# Patient Record
Sex: FEMALE | URBAN - METROPOLITAN AREA
[De-identification: names, ages, dates, MRNs, and addresses within clinical notes are randomized per-mention and may not be internally consistent; named-entity substitution may affect disease eponyms.]

---

## 2023-08-11 ENCOUNTER — APPOINTMENT (RX ONLY)
Dept: URBAN - METROPOLITAN AREA CLINIC 81 | Facility: CLINIC | Age: 48
Setting detail: DERMATOLOGY
End: 2023-08-11

## 2023-08-11 DIAGNOSIS — D492 NEOPLASM OF UNSPECIFIED NATURE OF BONE, SOFT TISSUE, AND SKIN: ICD-10-CM

## 2023-08-11 DIAGNOSIS — D485 NEOPLASM OF UNCERTAIN BEHAVIOR OF SKIN: ICD-10-CM

## 2023-08-11 DIAGNOSIS — L65.9 NONSCARRING HAIR LOSS, UNSPECIFIED: ICD-10-CM

## 2023-08-11 PROBLEM — D48.5 NEOPLASM OF UNCERTAIN BEHAVIOR OF SKIN: Status: ACTIVE | Noted: 2023-08-11

## 2023-08-11 PROBLEM — R22.32 LOCALIZED SWELLING, MASS AND LUMP, LEFT UPPER LIMB: Status: ACTIVE | Noted: 2023-08-11

## 2023-08-11 PROCEDURE — ? COUNSELING

## 2023-08-11 PROCEDURE — ? ORDER TESTS

## 2023-08-11 PROCEDURE — ? LIQUID NITROGEN

## 2023-08-11 PROCEDURE — 17110 DESTRUCTION B9 LES UP TO 14: CPT

## 2023-08-11 PROCEDURE — ? ADDITIONAL NOTES

## 2023-08-11 PROCEDURE — 99204 OFFICE O/P NEW MOD 45 MIN: CPT | Mod: 25

## 2023-08-11 PROCEDURE — ? PATIENT SPECIFIC COUNSELING

## 2023-08-11 PROCEDURE — ? OBSERVATION AND MEASURE

## 2023-08-11 ASSESSMENT — LOCATION ZONE DERM
LOCATION ZONE: SCALP
LOCATION ZONE: HAND
LOCATION ZONE: TRUNK

## 2023-08-11 ASSESSMENT — LOCATION DETAILED DESCRIPTION DERM
LOCATION DETAILED: LEFT RADIAL PALM
LOCATION DETAILED: LEFT SUPERIOR PARIETAL SCALP
LOCATION DETAILED: LEFT MEDIAL SUPERIOR CHEST

## 2023-08-11 ASSESSMENT — LOCATION SIMPLE DESCRIPTION DERM
LOCATION SIMPLE: SCALP
LOCATION SIMPLE: CHEST
LOCATION SIMPLE: LEFT HAND

## 2023-08-11 NOTE — PROCEDURE: PATIENT SPECIFIC COUNSELING
-disc currently most consistent with ISK vs AK\\n-treated with LN2\\n-discussed to RTC 4-6 wks if not improved or resolved s/p LN2\\n-will also reevaluate at follow up if still present
Detail Level: Zone
-pt noticed hair loss returned after starting another DMARD\\n-currently on TNF-a inhibitor and leflunomide\\n-reports hair loss is gradual, generalized and asymptomatic\\n-discussed suspicion of hair loss being from pt's DMARD medication as possible S/E\\n-pt states rheumatologist is also working pt up for this and is following\\n-reports recent thyroid blood work was WNL, will obtain record release for lab results\\n-will order labs to investigate any other possible aggravating factors that can be treated\\n- will update tx plan after reviewing results and will continue to monitor

## 2023-08-11 NOTE — HPI: SKIN LESION
Is This A New Presentation, Or A Follow-Up?: Growth
What Type Of Note Output Would You Prefer (Optional)?: Standard Output
How Severe Is Your Skin Lesion?: moderate
Has Your Skin Lesion Been Treated?: not been treated
Additional History: Patient reports no pain with this growth.
Is This A New Presentation, Or A Follow-Up?: Skin Lesion
Additional History: Patient reports no pain with the skin lesion. She is here for treatment of the spot if necessary.

## 2023-08-11 NOTE — PROCEDURE: ADDITIONAL NOTES
Additional Notes: -discussed if any acute changes/worsening or symptoms begin, recommend evaluation by hand surgeon for excision
Render Risk Assessment In Note?: no
Detail Level: Simple

## 2023-08-11 NOTE — PROCEDURE: ORDER TESTS
Lab Facility: [id number]
Expected Date Of Service: 08/14/2023
Billing Type: Third-Party Bill
Bill For Surgical Tray: no